# Patient Record
Sex: MALE | Race: WHITE | NOT HISPANIC OR LATINO | URBAN - NONMETROPOLITAN AREA
[De-identification: names, ages, dates, MRNs, and addresses within clinical notes are randomized per-mention and may not be internally consistent; named-entity substitution may affect disease eponyms.]

---

## 2020-01-15 ENCOUNTER — IMPORTED ENCOUNTER (OUTPATIENT)
Dept: URBAN - NONMETROPOLITAN AREA CLINIC 1 | Facility: CLINIC | Age: 35
End: 2020-01-15

## 2020-01-15 PROBLEM — T15.11XA: Noted: 2020-01-15

## 2020-01-15 PROCEDURE — 65205 REMOVE FOREIGN BODY FROM EYE: CPT

## 2020-01-15 PROCEDURE — 99203 OFFICE O/P NEW LOW 30 MIN: CPT

## 2020-01-15 NOTE — PATIENT DISCUSSION
Small Conjunctival FB OD-  discussed findings w/patient-  removed w/qtip and proparacaine-  mild irriation in that area patient had mild FB tracking as well-  start Tobradex QID OD x 5 days then d/c

## 2022-01-31 ENCOUNTER — EMERGENCY VISIT (OUTPATIENT)
Dept: URBAN - NONMETROPOLITAN AREA CLINIC 4 | Facility: CLINIC | Age: 37
End: 2022-01-31

## 2022-01-31 DIAGNOSIS — T15.02XA: ICD-10-CM

## 2022-01-31 PROCEDURE — 65222 REMOVE FOREIGN BODY FROM EYE: CPT

## 2022-01-31 ASSESSMENT — VISUAL ACUITY
OS_SC: 20/30
OD_SC: 20/30

## 2022-01-31 NOTE — PROCEDURE NOTE: CLINICAL
PROCEDURE NOTE: Removal of Corneal FB at Slit Lamp #1 OS. Diagnosis: Corneal Foreign Body. Anesthesia: Topical. The risks, benefits, and alternatives of foreign body removal were discussed. Pt signed consent form and scanned into attachments. Removed with 27g needle and/or foreign body spud and lisa. Use Ofloxacin QID (EYE) until seen back. Use ATs TID (EYE). Dawood Tyson

## 2022-02-07 ENCOUNTER — FOLLOW UP (OUTPATIENT)
Dept: URBAN - NONMETROPOLITAN AREA CLINIC 4 | Facility: CLINIC | Age: 37
End: 2022-02-07

## 2022-02-07 DIAGNOSIS — T15.02XD: ICD-10-CM

## 2022-02-07 PROCEDURE — 99212 OFFICE O/P EST SF 10 MIN: CPT

## 2022-02-07 ASSESSMENT — TONOMETRY
OD_IOP_MMHG: 17
OS_IOP_MMHG: 17

## 2022-02-07 ASSESSMENT — VISUAL ACUITY
OS_SC: 20/20
OD_SC: 20/25+2

## 2022-04-09 ASSESSMENT — VISUAL ACUITY
OD_CC: 20/20-2
OS_CC: 20/20

## 2022-04-09 ASSESSMENT — TONOMETRY
OS_IOP_MMHG: 14
OD_IOP_MMHG: 15

## 2022-04-22 ENCOUNTER — EMERGENCY VISIT (OUTPATIENT)
Dept: URBAN - NONMETROPOLITAN AREA CLINIC 4 | Facility: CLINIC | Age: 37
End: 2022-04-22

## 2022-04-22 DIAGNOSIS — H02.052: ICD-10-CM

## 2022-04-22 PROCEDURE — 67820 REVISE EYELASHES: CPT

## 2022-04-22 PROCEDURE — 99213 OFFICE O/P EST LOW 20 MIN: CPT

## 2022-04-22 ASSESSMENT — TONOMETRY
OS_IOP_MMHG: 16
OD_IOP_MMHG: 15

## 2022-04-22 ASSESSMENT — VISUAL ACUITY
OU_SC: 20/20
OD_SC: 20/20
OS_SC: 20/20

## 2022-04-22 NOTE — PROCEDURE NOTE: CLINICAL
PROCEDURE NOTE: Epilation #3 Right Lower Lid. Diagnosis: Trichiasis without Entropion. Anesthesia: Topical. Prior to treatment, the risks/benefits/alternatives were discussed. The patient wished to proceed with procedure. Aberrant lashes removed from * using microforceps. Patient tolerated procedure well. There were no complications. Post-op instructions given. Link Veliz